# Patient Record
Sex: FEMALE | Race: WHITE | NOT HISPANIC OR LATINO | Employment: OTHER | URBAN - METROPOLITAN AREA
[De-identification: names, ages, dates, MRNs, and addresses within clinical notes are randomized per-mention and may not be internally consistent; named-entity substitution may affect disease eponyms.]

---

## 2017-12-27 ENCOUNTER — IMPORTED ENCOUNTER (OUTPATIENT)
Dept: URBAN - METROPOLITAN AREA CLINIC 9 | Facility: CLINIC | Age: 62
End: 2017-12-27

## 2018-04-11 ENCOUNTER — IMPORTED ENCOUNTER (OUTPATIENT)
Dept: URBAN - METROPOLITAN AREA CLINIC 9 | Facility: CLINIC | Age: 63
End: 2018-04-11

## 2018-07-20 ENCOUNTER — IMPORTED ENCOUNTER (OUTPATIENT)
Dept: URBAN - METROPOLITAN AREA CLINIC 9 | Facility: CLINIC | Age: 63
End: 2018-07-20

## 2018-11-20 NOTE — PATIENT DISCUSSION
(M19.012) Dermatochalasis of right upper eyelid - Assesment : Examination revealed Dermatochalasis - Plan : Monitor for changes. Advised patient to call our office with decreased vision or increased symptoms.

## 2018-11-20 NOTE — PATIENT DISCUSSION
(H40.013) Open angle with borderline findings, low risk, bilateral - Assesment : Examination revealed suspicion for Open Angle Glaucoma. - SUSPECT  INCREASED C/D  OS NERVE TILTED  IOP OU WNL TODAY RECOMMEND FURTHER TESTING TO EVAL.   NO FAMILY H/O GLAUCOMA - Plan : FUNDUS PHOTOS OF NERVES TODAY  4-5 MONTHS, 24-2, ON OCT ,CCT

## 2018-11-20 NOTE — PATIENT DISCUSSION
(N49.103) Vitreous degeneration, bilateral - Assesment : Examination revealed PVD - Plan : Monitor for changes. Advised patient to call our office with decreased vision or an increase in flashes and/or floaters.

## 2018-11-20 NOTE — PATIENT DISCUSSION
(H25.13) Age-related nuclear cataract, bilateral - Assesment : Examination revealed cataract. 2+ Stephanie OU - Plan : Monitor for changes. Advised patient to call our office with decreased vision or increased symptoms.  1 YEAR EXAM/ MAC OCT

## 2018-11-20 NOTE — PATIENT DISCUSSION
(L49.533) Dermatochalasis of left upper eyelid - Assesment : Examination revealed Dermatochalasis - Plan : Monitor for changes. Advised patient to call our office with decreased vision or increased symptoms.

## 2019-03-06 ENCOUNTER — IMPORTED ENCOUNTER (OUTPATIENT)
Dept: URBAN - METROPOLITAN AREA CLINIC 9 | Facility: CLINIC | Age: 64
End: 2019-03-06

## 2019-03-14 NOTE — PATIENT DISCUSSION
(H25.13) Age-related nuclear cataract, bilateral - Assesment : Examination revealed cataract. 2+ Stephanie OU - Plan : Monitor for changes. Advised patient to call our office with decreased vision or increased symptoms.

## 2019-03-14 NOTE — PATIENT DISCUSSION
(H40.013) Open angle with borderline findings, low risk, bilateral - Assesment : Examination revealed suspicion for Open Angle Glaucoma. - LOW RISK - Plan : 1 YEAR  GONIO

## 2019-03-27 ENCOUNTER — IMPORTED ENCOUNTER (OUTPATIENT)
Dept: URBAN - METROPOLITAN AREA CLINIC 9 | Facility: CLINIC | Age: 64
End: 2019-03-27

## 2019-05-28 ENCOUNTER — IMPORTED ENCOUNTER (OUTPATIENT)
Dept: URBAN - METROPOLITAN AREA CLINIC 9 | Facility: CLINIC | Age: 64
End: 2019-05-28

## 2019-06-11 ENCOUNTER — IMPORTED ENCOUNTER (OUTPATIENT)
Dept: URBAN - METROPOLITAN AREA CLINIC 9 | Facility: CLINIC | Age: 64
End: 2019-06-11

## 2019-08-14 ENCOUNTER — IMPORTED ENCOUNTER (OUTPATIENT)
Dept: URBAN - METROPOLITAN AREA CLINIC 9 | Facility: CLINIC | Age: 64
End: 2019-08-14

## 2020-05-20 ENCOUNTER — IMPORTED ENCOUNTER (OUTPATIENT)
Dept: URBAN - METROPOLITAN AREA CLINIC 9 | Facility: CLINIC | Age: 65
End: 2020-05-20

## 2020-05-20 PROBLEM — Z96.1: Noted: 2020-05-20

## 2020-05-20 PROBLEM — H26.493: Noted: 2020-05-20

## 2021-05-07 NOTE — PATIENT DISCUSSION
Patient understands that updating rx will only give marginal improvement and do not recommend changing glasses if patient is going to consider cataract surgery.

## 2021-05-07 NOTE — PATIENT DISCUSSION
The cataracts are responsible for the patient's decrease in vision and may Consider cataract surgery to improve quality and clarity of vision.

## 2021-05-28 NOTE — PATIENT DISCUSSION
Recommend removal in minor surgery, Patient leaving to go [place] June 6th-okay to set up minor after return from up [facility].

## 2021-10-16 ASSESSMENT — TONOMETRY
OS_IOP_MMHG: 16
OD_IOP_MMHG: 12
OS_IOP_MMHG: 15
OD_IOP_MMHG: 17
OS_IOP_MMHG: 13
OD_IOP_MMHG: 15
OS_IOP_MMHG: 12
OD_IOP_MMHG: 15

## 2021-10-16 ASSESSMENT — KERATOMETRY
OD_K2POWER_DIOPTERS: 45.25
OS_AXISANGLE_DEGREES: 167
OS_K1POWER_DIOPTERS: 44.25
OD_K2POWER_DIOPTERS: 45.25
OS_AXISANGLE_DEGREES: 165
OS_AXISANGLE2_DEGREES: 77
OD_AXISANGLE2_DEGREES: 88
OD_K1POWER_DIOPTERS: 43.75
OD_AXISANGLE2_DEGREES: 89
OS_K1POWER_DIOPTERS: 44
OS_K2POWER_DIOPTERS: 45.5
OD_AXISANGLE_DEGREES: 179
OD_AXISANGLE_DEGREES: 178
OS_AXISANGLE2_DEGREES: 75
OS_K2POWER_DIOPTERS: 45.75
OD_K1POWER_DIOPTERS: 44

## 2021-10-16 ASSESSMENT — VISUAL ACUITY
OS_SC: 20/20 - SN
OD_SC: 20/20 SN
OD_SC: 20/20 SN
OD_SC: 20/25 SN
OD_SC: 20/20 SN
OS_SC: 20/20 - SN
OS_SC: 20/25 - SN
OS_SC: 20/20 SN
OS_SC: 20/20 SN
OD_SC: 20/20 SN
OS_SC: 20/20 - SN
OS_SC: 20/20 SN
OD_SC: 20/20 SN
OS_SC: 20/20 SN
OS_SC: 20/20 - SN
OD_SC: 20/20 SN

## 2021-11-01 NOTE — PROCEDURE NOTE: CLINICAL
PROCEDURE NOTE: Excision of Eyelid Lesion Right Upper Lid. Diagnosis: Eyelid Lesion, Benign. Anesthesia: Topical. Prep: Betadine Flush. Prior to treatment, the risks/benefits/alternatives were discussed. The patient wished to proceed with procedure. Local anesthetic was given. The eyelid was prepped and draped for procedure. The lesion was incised and removed. The wound was repaired with sutures. Patient tolerated procedure well. There were no complications. Post procedure instructions given. Queen Toñito

## 2021-11-01 NOTE — PATIENT DISCUSSION
The risks, benefits, and alternatives to surgery were discussed. The patient elects to proceed with minor surgery.

## 2021-11-10 ENCOUNTER — ESTABLISHED PATIENT (OUTPATIENT)
Dept: URBAN - METROPOLITAN AREA CLINIC 15 | Facility: CLINIC | Age: 66
End: 2021-11-10

## 2021-11-10 DIAGNOSIS — H02.835: ICD-10-CM

## 2021-11-10 DIAGNOSIS — H02.831: ICD-10-CM

## 2021-11-10 DIAGNOSIS — H26.493: ICD-10-CM

## 2021-11-10 DIAGNOSIS — H02.834: ICD-10-CM

## 2021-11-10 DIAGNOSIS — H17.13: ICD-10-CM

## 2021-11-10 DIAGNOSIS — H02.832: ICD-10-CM

## 2021-11-10 DIAGNOSIS — Z96.1: ICD-10-CM

## 2021-11-10 PROCEDURE — 92014 COMPRE OPH EXAM EST PT 1/>: CPT

## 2021-11-10 ASSESSMENT — VISUAL ACUITY
OD_SC: J5
OS_SC: 20/25 -2
OD_SC: 20/25
OS_SC: J2

## 2021-11-10 ASSESSMENT — TONOMETRY
OS_IOP_MMHG: 16
OD_IOP_MMHG: 16

## 2021-11-11 NOTE — PATIENT DISCUSSION
ADVANCED IOLs not recommended due to EOMs, AMD and does not want any glare or halos with night driving.

## 2021-11-11 NOTE — PATIENT DISCUSSION
NSC are visually significant . The cataracts are responsible for some of the patient's decrease in vision .

## 2021-11-11 NOTE — PATIENT DISCUSSION
Discussed pt's lifestyle and VA expectations with cataract surgery. Pt stated enjoys removing his glasses to read but wishes to see better at distance for driving.

## 2021-11-11 NOTE — PATIENT DISCUSSION
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 The patient was advised that there will be a necessary period of adaptation to this new vision and they will miss their unaided reading vision. The patient understands and chooses good uncorrected distance vision with the possibility of a refractive surprise.

## 2021-11-11 NOTE — PATIENT DISCUSSION
CONSIDER OD TO FOLLOW. EYE OD, IOL TYPE CUSTOM, POST OPERATIVE [employer] CONSIDER -2.00, PACKAGE  CUSTOM.

## 2021-11-11 NOTE — PATIENT DISCUSSION
Recommend considering CUSTOM Package, focus near and wear glasses for distance. Pt wishes to proceed.

## 2021-12-02 NOTE — PATIENT DISCUSSION
Discussed pt's lifestyle and VA expectations with cataract surgery. Pt stated enjoys removing his glasses to read . Admission

## 2021-12-07 NOTE — PATIENT DISCUSSION
Discussed pt's lifestyle and VA expectations with cataract surgery. Pt stated enjoys removing his glasses to read .

## 2022-05-19 NOTE — PATIENT DISCUSSION
Indications, risks, benefits and alternatives to YAG capsulotomy discussed with patient. Questions answered. Educational handout given. OD first then consider OS.

## 2022-05-25 NOTE — PATIENT DISCUSSION
Access center was called at [phone] regarding [facility]. Trauma physician was paged at [phone], and Eren Butler returned call to  at [phone]; consult was completed at this time. Transport needs were given at [id number], and we are now waiting on an ETA.      Holden Stapleton  06/01/21 7420 Recommend considering CUSTOM Package, focus near and wear glasses for distance. Pt wishes to proceed.

## 2022-11-30 ENCOUNTER — ESTABLISHED PATIENT (OUTPATIENT)
Dept: URBAN - METROPOLITAN AREA CLINIC 15 | Facility: CLINIC | Age: 67
End: 2022-11-30

## 2022-11-30 DIAGNOSIS — Z96.1: ICD-10-CM

## 2022-11-30 DIAGNOSIS — H17.13: ICD-10-CM

## 2022-11-30 DIAGNOSIS — H26.493: ICD-10-CM

## 2022-11-30 DIAGNOSIS — H02.834: ICD-10-CM

## 2022-11-30 DIAGNOSIS — H02.832: ICD-10-CM

## 2022-11-30 DIAGNOSIS — H02.835: ICD-10-CM

## 2022-11-30 DIAGNOSIS — H02.831: ICD-10-CM

## 2022-11-30 PROCEDURE — 92014 COMPRE OPH EXAM EST PT 1/>: CPT

## 2022-11-30 ASSESSMENT — VISUAL ACUITY
OD_SC: J3
OD_GLARE: 20/80
OS_SC: 20/30-2
OS_GLARE: 20/70
OD_SC: 20/20
OS_SC: J3

## 2022-11-30 ASSESSMENT — TONOMETRY
OD_IOP_MMHG: 17
OS_IOP_MMHG: 19

## 2023-01-17 NOTE — PATIENT DISCUSSION
Recommended observation. May consider smaller BF , golf sunglasses or fill just for distance and take glasses off to read .

## 2023-01-17 NOTE — PATIENT DISCUSSION
OPTOS performed today to document and monitor changes.  Degeneration is mild and has minimal impact on vision at this time.

## 2024-12-30 NOTE — PATIENT DISCUSSION
NSC are visually significant . The cataracts are responsible for some of the patient's decrease in vision . [de-identified] : Right thigh pain, mild to moderate right hip osteoarthritis  Extensive discussion of the natural history of this issue was had with the patient.  We discussed the treatment options focusing on conservative therapy which includes anti-inflammatories, physical therapy/home exercise, & activity modification.   Recommend meloxicam however patient declined and will use over-the-counter's as needed. Ultrasound-guided steroid injection of the right hip was ordered.  Discussed patient is a very close attention to how he feels after the injection to see what is coming from the hip versus outside of the hip.   Patient will follow-up as needed.  The patient's current medication management of their orthopedic diagnosis was reviewed today: (1) We discussed a comprehensive treatment plan that included possible pharmaceutical management involving the use of prescription strength medications including but not limited to options such as oral Ibuprofen 400mg QID, once daily Meloxicam 15 mg, or 500-650 mg Tylenol versus over the counter oral medications and topical prescription NSAID Pennsaid vs over the counter Voltaren gel. (2) There is a moderate risk of morbidity with further treatment, especially from use of prescription strength medications and possible side effects of these medications which include upset stomach with oral medications, skin reactions to topical medications and cardiac/renal issues with long term use. (3) I recommended that the patient follow-up with their medical physician to discuss any significant specific potential issues with long term medication use such as interactions with current medications or with exacerbation of underlying medical comorbidities. (4) The benefits and risks associated with use of injectable, oral or topical, prescription and over the counter anti-inflammatory medications were discussed with the patient. The patient voiced understanding of the risks including but not limited to bleeding, stroke, kidney dysfunction, heart disease, and were referred to the black box warning label for further information.